# Patient Record
(demographics unavailable — no encounter records)

---

## 2024-12-05 NOTE — DISCUSSION/SUMMARY
[FreeTextEntry1] : 63 YO F with no sig PMH, who presented to [facility] with chest pain and was treated as NSTEMI. The CCTA showed prox LAD severe disease and patient was brought to [facility] S/P PCI of the prox LAD with mid LAD myocardial bridge. TTE unremarkable. Patient on ASA/brilinta/statin. She has no CP/SOB/FAJARDO/palpitations/syncope. Cardiac rehab   CAD Asymptomatic Continue aspirin and Plavix until February and then stop Plavix. Patient is off statin.  Will check the liver function and will start the patient on a different category of antihyperlipidemic medications.  Blood pressure is well-controlled.   [EKG obtained to assist in diagnosis and management of assessed problem(s)] : EKG obtained to assist in diagnosis and management of assessed problem(s)

## 2024-12-05 NOTE — HISTORY OF PRESENT ILLNESS
[FreeTextEntry1] : 63 YO F with no sig PMH, who presented to [facility] with chest pain and was treated as NSTEMI. The CCTA showed prox LAD severe disease and patient was brought to [facility] S/P PCI of the prox LAD with mid LAD myocardial bridge. TTE unremarkable. Patient on ASA/brilinta/statin. She has no CP/SOB/FAJARDO/palpitations/syncope. Cardiac rehab is pending.   6/6/24 TTE: 5/24: Nl EF, no valvular disease. On ASA/brilinta until 2/25. LDL: 60s. No chest pain/SOB/FAJARDO. Patient has a lot of bruising,  Going to rehab   12/5/2024 patient is going to the gym twice a week.  Patient is asymptomatic.  Patient is taking her medications.  On aspirin and Plavix.  Patient has had elevated liver enzymes and has stopped statins.  Patient has a very sensitive liver and even if she takes Tylenol couple of times she will have elevated liver enzymes.

## 2025-02-27 NOTE — HISTORY OF PRESENT ILLNESS
[FreeTextEntry1] : 65 YO F with no sig PMH, who presented to [facility] with chest pain and was treated as NSTEMI. The CCTA showed prox LAD severe disease and patient was brought to [facility] S/P PCI of the prox LAD with mid LAD myocardial bridge. TTE unremarkable. Patient on ASA/brilinta/statin. She has no CP/SOB/FAJARDO/palpitations/syncope. Cardiac rehab is pending.   6/6/24 TTE: 5/24: Nl EF, no valvular disease. On ASA/brilinta until 2/25. LDL: 60s. No chest pain/SOB/FAJARDO. Patient has a lot of bruising,  Going to rehab   12/5/2024 patient is going to the gym twice a week.  Patient is asymptomatic.  Patient is taking her medications.  On aspirin and Plavix.  Patient has had elevated liver enzymes and has stopped statins.  Patient has a very sensitive liver and even if she takes Tylenol couple of times she will have elevated liver enzymes.  2/27/2025 patient is feeling fine.  Patient is going to the gym regularly.  Has no chest pain/shortness of breath/dyspnea on exertion/palpitation/syncope.  Patient is taking her aspirin Plavix and Repatha.  LDL is well-controlled in the 40s range.

## 2025-02-27 NOTE — DISCUSSION/SUMMARY
[FreeTextEntry1] : 65 YO F with no sig PMH, who presented to [facility] with chest pain and was treated as NSTEMI. The CCTA showed prox LAD severe disease and patient was brought to [facility] S/P PCI of the prox LAD with mid LAD myocardial bridge. TTE unremarkable. Patient on ASA/brilinta/statin. She has no CP/SOB/FAJARDO/palpitations/syncope. Cardiac rehab   CAD Asymptomatic Repeat the TTE in the next couple of months. Will stop the Plavix and continue only on aspirin and Repatha. Patient's blood pressure is well-controlled. In the next following visits we will do a carotid ultrasound on the patient. Will see the patient in a year. Will do a coronary CT in 2 to 3 years to follow-up the mid LAD mild plaque/bridging. The next of the vessels do not have much plaque and I believe that the patient will have a very good prognosis with this medical therapy.    [EKG obtained to assist in diagnosis and management of assessed problem(s)] : EKG obtained to assist in diagnosis and management of assessed problem(s)

## 2025-05-05 NOTE — PHYSICAL EXAM
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

## 2025-05-05 NOTE — HISTORY OF PRESENT ILLNESS
[Ambidextrous] : ambidextrous [FreeTextEntry1] : b/l thumb pain for several years. L >R Worse with gripping activities or yoga. No trauma, No treatment, no numbness or tingling.

## 2025-05-05 NOTE — PHYSICAL EXAM
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

## 2025-05-05 NOTE — ASSESSMENT
[FreeTextEntry1] : 65F with b/l thumb CMC OA  -Discussed diagnosis, natural history of condition, and treatment options -OT for thenar strengthening -Aleve 1 in am and 1 in pm for 3 weeks -Advised to Obtain comfort cools  -Ice at night, heat in am -Discussed utility of steroid, provided today -Discussed risks, benefits, and alternatives to surgery in the form of basal joint arthroplasty which included infection, weakness, need for further surgery, and pain. Patient not interested in surgery at this time -Return in 2-3 months if no improvement, consider steroid injection again or surgery -All questions answered, patient in agreement

## 2025-07-02 NOTE — DATA REVIEWED
[Lung Cancer Screening] : Patient underwent lung cancer screening [2] : 2 [TextBox_12] : 07/22 [TextBox_27] : 07/23 [TextBox_42] : 07/24 [TextBox_52] : 3

## 2025-07-02 NOTE — PLAN
[Smoking Cessation] : smoking cessation [FreeTextEntry1] : Plan:  -Low dose CT chest for lung cancer screening.          -Follow up with her referring provider after her LDCT results have been reviewed by the multidisciplinary clinical team, if needed.    -Encourage continued smoking abstinence.     Should I screen? tool utilized. 6 Year risk of lung cancer is   1.4%.    Patient wishes to proceed with screening.     Engaged in shared decision making with MsCa RAJINDER . Answered all questions. She verbalized understanding and agreement. She knows to call back with and questions or concerns.

## 2025-07-02 NOTE — HISTORY OF PRESENT ILLNESS
[Former] : Former [TextBox_13] :  PCP [facility] Dr. Remy Hernandez  JEMIMA CALVILLO had telephonic visit for a review of eligibility and discussion of the Low dose CT lung cancer screening program. The following was reviewed and confirmed the patient meets screening eligibility criteria.  Smoking Status: -Former smoker -Number of pack(s) per day: 1/2 - 3/4 PPD -Number of years smoked: 35 -Number of pack years smokin -Number of years since quitting smokin Quit year:   Ms. CALVILLO denies any signs or symptoms of lung cancer including new cough, changing cough, hemoptysis, and unintentional weight loss.  Ms. CALVILLO reports hx ovarian cancer- had total hysterectomy . 2024 AMI with Stent placement, pulmonary nodules and celiac disease. Denies DX of lung disease and connective tissue disease. Reports no lung cancer in a 1st degree relative. Reports no lung cancer in a 2nd degree relative. Denies any history of occupational exposures. Had exposure to 2nd hand smoke as a child.   [YearQuit] : 2010 [PacksperYear] : 21